# Patient Record
Sex: FEMALE | Race: BLACK OR AFRICAN AMERICAN | URBAN - METROPOLITAN AREA
[De-identification: names, ages, dates, MRNs, and addresses within clinical notes are randomized per-mention and may not be internally consistent; named-entity substitution may affect disease eponyms.]

---

## 2020-04-13 ENCOUNTER — OFFICE VISIT CONVERTED (OUTPATIENT)
Dept: OTOLARYNGOLOGY | Facility: CLINIC | Age: 40
End: 2020-04-13
Attending: OTOLARYNGOLOGY

## 2020-04-16 ENCOUNTER — HOSPITAL ENCOUNTER (OUTPATIENT)
Dept: GENERAL RADIOLOGY | Facility: HOSPITAL | Age: 40
Discharge: HOME | End: 2020-04-16
Attending: OTOLARYNGOLOGY

## 2020-04-20 ENCOUNTER — TELEPHONE CONVERTED (OUTPATIENT)
Dept: OTOLARYNGOLOGY | Facility: CLINIC | Age: 40
End: 2020-04-20
Attending: OTOLARYNGOLOGY

## 2020-06-19 ENCOUNTER — LAB REQUISITION (OUTPATIENT)
Dept: LAB | Facility: OTHER | Age: 40
End: 2020-06-19

## 2020-06-19 DIAGNOSIS — Z11.1 SCREENING FOR TUBERCULOSIS: ICD-10-CM

## 2020-06-19 PROCEDURE — 86481 TB AG RESPONSE T-CELL SUSP: CPT | Performed by: EMERGENCY MEDICINE

## 2020-06-21 LAB
TSPOT INTERPRETATION: NEGATIVE
TSPOT NIL CONTROL INTERPRETATION: NORMAL
TSPOT PANEL A: 1
TSPOT PANEL B: 0
TSPOT POS CONTROL INTERPRETATION: NORMAL

## 2021-05-10 NOTE — H&P
"   History and Physical      Patient Name: Lori Pedraza   Patient ID: [id number]   Sex: Female   YOB: 1980    Primary Care Provider: No PCP No PCP Other   Referring Provider: Elham Feliciano MD    Visit Date: April 13, 2020    Provider: Vargas Pedraza MD   Location: [facility]   Location Address: [address] [place] [address], [address]  [id number]   Location Phone: [phone]          Chief Complaint     \"I feel like my throat is swollen.\"       History Of Present Illness  Lori Pedraza is a 40 year old female who presents to the office today as a consult from Elham Feliciano MD for evaluation of her throat. She tells me that around 2 to 3 weeks ago she developed a sensation like her throat with swelling acutely. She points to the midline of her neck below her cricoid cartialge when asked where she felt the sensation. This was also associated with neck soreness in the area of level 2 bilaterally. She was initially seen via telehealth and prescribed a course of amoxicillin. When the sensation worsened she presented to the ER on 4/5/2020 for routine laboratory evaluation revealed a slight leukocytosis at 12.87 without any shift and normal thyroid function. She tells me that she has not had any issues with dysphasia or hoarseness. She denies any fever, chills, or night sweats. The throat swelling sensation does not seem to be associated with certain foods. She does report a frequent history of strep throat and viral tonsillitis where she experiences these around 5 times annually. She does report some fatigue. She denies any issues with acid reflux and she has never smoked. She has not undergone any imaging.       Past Medical History  Pancreatitis; Throat swelling         Past Surgical History  *No Past Surgical History         Medication List  hydrocortisone 2.5 % topical cream; meloxicam 15 mg oral tablet         Allergy List  NO KNOWN DRUG ALLERGIES         Family Medical " "History  Family history of breast cancer; Family history of heart disease; Family history of malignant neoplasm of upper lobe bronchus or lung; Family history of diabetes mellitus (DM)         Social History  Tobacco (Never)         Review of Systems  · Constitutional  o Denies  o : fever, night sweats, weight loss  · Eyes  o Denies  o : discharge from eye, impaired vision  · HENT  o Admits  o : *See HPI  · Cardiovascular  o Denies  o : chest pain, irregular heart beats  · Respiratory  o Denies  o : shortness of breath, wheezing, coughing up blood  · Gastrointestinal  o Denies  o : heartburn, reflux, vomiting blood  · Genitourinary  o Denies  o : frequency  · Integument  o Admits  o : rash, skin dryness  · Neurologic  o Denies  o : seizures, loss of balance, loss of consciousness, dizziness  · Endocrine  o Denies  o : cold intolerance, heat intolerance  · Heme-Lymph  o Denies  o : easy bleeding, anemia      Vitals  Date Time BP Position Site L\R Cuff Size HR RR TEMP (F) WT  HT  BMI kg/m2 BSA m2 O2 Sat        04/13/2020 09:50 AM      94 - R 16 97.5 217lbs 4oz 5'  2\" 39.74 2.08 98 %          Physical Examination  · Constitutional  o Appearance  o : well developed, well-nourished, alert and in no acute distress, voice clear and strong  · Head and Face  o Head  o :   § Inspection  § : no deformities or lesions  o Face  o :   § Inspection  § : No facial lesions; House-Brackmann I/VI bilaterally  § Palpation  § : No TMJ crepitus nor  muscle tenderness bilaterally  · Eyes  o Vision  o :   § Visual Fields  § : Extraocular movements are intact. No spontaneous or gaze-induced nystagmus.  o Conjunctivae  o : clear  o Sclerae  o : clear  o Pupils and Irises  o : pupils equal, round, and reactive to light.   · Ears, Nose, Mouth and Throat  o Ears  o :   § External Ears  § : appearance within normal limits, no lesions present  § Otoscopic Examination  § : tympanic membrane appearance within normal limits bilaterally " without perforations, well-aerated middle ears  § Hearing  § : intact to conversational voice both ears  o Nose  o :   § External Nose  § : appearance normal  § Intranasal Exam  § : mucosa within normal limits, vestibules normal, no intranasal lesions present, septum slightly deviated to the right, sinuses non tender to percussion  o Oral Cavity  o :   § Oral Mucosa  § : oral mucosa normal without pallor or cyanosis  § Lips  § : lip appearance normal  § Teeth  § : normal dentition for age  § Gums  § : gums pink, non-swollen, no bleeding present  § Tongue  § : tongue appearance normal; normal mobility  § Palate  § : hard palate normal, soft palate appearance normal with symmetric mobility  o Throat  o :   § Oropharynx  § : no inflammation or lesions present, tonsils 2+ and cryptic bilaterally   § Hypopharynx  § : Deferred secondary to gag reflex  § Larynx  § : Deferred secondary to gag reflex  · Neck  o Inspection/Palpation  o : normal appearance, no masses or tenderness, trachea midline; thyroid size normal, slightly tender, no nodules or masses present on palpation  · Respiratory  o Respiratory Effort  o : breathing unlabored  o Inspection of Chest  o : normal appearance, no retractions  · Cardiovascular  o Heart  o : regular rate and rhythm  · Lymphatic  o Neck  o : no lymphadenopathy present  o Supraclavicular Nodes  o : no lymphadenopathy present  o Preauricular Nodes  o : no lymphadenopathy present  · Skin and Subcutaneous Tissue  o General Inspection  o : Regarding face and neck - there are no rashes present, no lesions present, and no areas of discoloration  · Neurologic  o Cranial Nerves  o : cranial nerves II-XII are grossly intact bilaterally  o Gait and Station  o : normal gait, able to stand without diffculty  · Psychiatric  o Judgement and Insight  o : judgment and insight intact  o Mood and Affect  o : mood normal, affect appropriate          Assessment  · Recurrent tonsillitis     463/J03.91  · Throat  fullness     784.99/R68.89    Problems Reconciled  Plan  · Orders  o Ultrasound Thyroid. (65421) - 784.99/R68.89 - 04/13/2020  · Medications  o Medications have been Reconciled  o Transition of Care or Provider Policy  · Instructions  o Impressions and findings were discussed with Ms. Pedraza at great length. Currently, she is experiencing a fullness or swelling sensation in the midline of in the area of her thyroid which did not improve with a course of amoxicillin. She also reports frequent issues with tonsillitis. Examination today reveals slight tenderness to palpation in the area of her thyroid but no obviously palpable nodule. She is not having any other issues with dysphasia or hoarseness but does report some fatigue. We discussed the differential including infectious or inflammatory possibilities. Options for further evaluation were discussed including work-up of Antoine-Barr viral status versus a thyroid ultrasound as this is the area where she feels the most fullness. After thorough discussion she would like to pursue a thyroid ultrasound and will follow-up via telehealth after completion of the ultrasound or sooner if needed.  · Correspondence  o ENT Letter to Referring MD (Elham Feliciano MD) - 04/13/2020            Electronically Signed by: Vargas Pedraza MD -Author on April 13, 2020 10:26:37 AM

## 2021-05-12 NOTE — PROGRESS NOTES
"   Quick Note      Patient Name: Lori Pedraza   Patient ID: [id number]   Sex: Female   YOB: 1980    Primary Care Provider: No PCP No PCP Other   Referring Provider: Elham Feliciano MD    Visit Date: April 20, 2020    Provider: Vargas Pedraza MD   Location: [facility]   Location Address: [address] [place] [address], [address]  [id number]   Location Phone: [phone]          History Of Present Illness  TELEHEALTH TELEPHONE VISIT  Chief Complaint: \"My neck still feels swollen.\"   Lori Pedraza is a 40 year old female who is presenting for evaluation via telehealth telephone visit. Verbal consent obtained before beginning visit by SIOMARA Anthony and CORRY Bay. She was originally seen on 4/13/2020 secondary to neck/throat fullness in the area of her thyroid. Examination that day was unremarkable and an ultrasound of the thyroid was ordered for further evaluation. She underwent a thyroid ultrasound on 4/16/2020 which did not reveal any evidence of nodule or inflammation. She tells me that she still feels like her neck is swollen.   Provider spent 5 minutes with the patient during telehealth visit.   The following staff were present during this visit: Vargas Pedraza MD   Past Medical History/Overview of Patient Symptoms          Assessment  · Throat fullness     784.99/R68.89      Plan  · Orders  o Physican Telephone evaluation, 5-10 min (47188) - 784.99/R68.89 - 04/20/2020  · Medications  o prednisone 20 mg oral tablet   SIG: take 2 tablets (40 mg) by oral route once daily for 5 days   DISP: (10) tablets with 0 refills  Prescribed on 04/20/2020     o cyclobenzaprine 5 mg oral tablet   SIG: take 1 tablet by oral route once a day (at bedtime) for 14 days   DISP: (14) tablets with 1 refills  Prescribed on 04/20/2020     · Instructions  o Plan Of Care: Impressions and findings were discussed with Ms. Pedraza at great length. We reviewed the results of her thyroid ultrasound which was " unremarkable. In regards to her throat fullness we discussed the differential as far as further options for management and evaluation. After thorough discussion we will pursue empiric treatment with prednisone and a low-dose muscle relaxer in case this represents muscle inflammation. We also discussed the possibility of Antoine-Flanagan viral testing. She will call back if she is not doing any better.            Electronically Signed by: Vargas Pedraza MD -Author on April 20, 2020 09:32:02 AM

## 2021-05-15 VITALS
BODY MASS INDEX: 39.98 KG/M2 | HEART RATE: 94 BPM | RESPIRATION RATE: 16 BRPM | HEIGHT: 62 IN | WEIGHT: 217.25 LBS | TEMPERATURE: 97.5 F | OXYGEN SATURATION: 98 %